# Patient Record
Sex: MALE | Race: WHITE | NOT HISPANIC OR LATINO | Employment: OTHER | URBAN - METROPOLITAN AREA
[De-identification: names, ages, dates, MRNs, and addresses within clinical notes are randomized per-mention and may not be internally consistent; named-entity substitution may affect disease eponyms.]

---

## 2018-11-23 ENCOUNTER — OFFICE VISIT (OUTPATIENT)
Dept: URGENT CARE | Facility: PHYSICIAN GROUP | Age: 53
End: 2018-11-23
Payer: MEDICARE

## 2018-11-23 VITALS
OXYGEN SATURATION: 95 % | WEIGHT: 207 LBS | TEMPERATURE: 97.5 F | HEIGHT: 70 IN | DIASTOLIC BLOOD PRESSURE: 72 MMHG | SYSTOLIC BLOOD PRESSURE: 124 MMHG | BODY MASS INDEX: 29.63 KG/M2 | HEART RATE: 72 BPM | RESPIRATION RATE: 12 BRPM

## 2018-11-23 DIAGNOSIS — M62.830 LUMBAR PARASPINAL MUSCLE SPASM: ICD-10-CM

## 2018-11-23 PROCEDURE — 99204 OFFICE O/P NEW MOD 45 MIN: CPT | Performed by: PHYSICIAN ASSISTANT

## 2018-11-23 RX ORDER — BACLOFEN 10 MG/1
10 TABLET ORAL 3 TIMES DAILY
COMMUNITY
End: 2018-11-24

## 2018-11-23 RX ORDER — TAMSULOSIN HYDROCHLORIDE 0.4 MG/1
0.4 CAPSULE ORAL
COMMUNITY

## 2018-11-23 RX ORDER — MELOXICAM 15 MG/1
15 TABLET ORAL DAILY
COMMUNITY

## 2018-11-23 RX ORDER — METOPROLOL SUCCINATE 50 MG/1
50 TABLET, EXTENDED RELEASE ORAL DAILY
COMMUNITY
End: 2018-11-24

## 2018-11-23 RX ORDER — FINASTERIDE 5 MG/1
5 TABLET, FILM COATED ORAL DAILY
COMMUNITY

## 2018-11-23 RX ORDER — INSULIN GLARGINE 100 [IU]/ML
44 INJECTION, SOLUTION SUBCUTANEOUS EVERY MORNING
COMMUNITY

## 2018-11-23 RX ORDER — KETOROLAC TROMETHAMINE 30 MG/ML
60 INJECTION, SOLUTION INTRAMUSCULAR; INTRAVENOUS ONCE
Status: COMPLETED | OUTPATIENT
Start: 2018-11-23 | End: 2018-11-23

## 2018-11-23 RX ORDER — TIZANIDINE HYDROCHLORIDE 2 MG/1
2 CAPSULE, GELATIN COATED ORAL 2 TIMES DAILY
COMMUNITY

## 2018-11-23 RX ORDER — MECLIZINE HYDROCHLORIDE 25 MG/1
25 TABLET ORAL 3 TIMES DAILY PRN
COMMUNITY

## 2018-11-23 RX ORDER — HYDROCODONE BITARTRATE AND ACETAMINOPHEN 10; 325 MG/1; MG/1
1 TABLET ORAL EVERY 6 HOURS PRN
COMMUNITY

## 2018-11-23 RX ORDER — SERTRALINE HYDROCHLORIDE 100 MG/1
200 TABLET, FILM COATED ORAL DAILY
COMMUNITY

## 2018-11-23 RX ORDER — PREGABALIN 100 MG/1
100 CAPSULE ORAL 3 TIMES DAILY
COMMUNITY

## 2018-11-23 RX ORDER — ALBUTEROL SULFATE 90 UG/1
2 AEROSOL, METERED RESPIRATORY (INHALATION) EVERY 6 HOURS PRN
COMMUNITY

## 2018-11-23 RX ORDER — CYCLOBENZAPRINE HCL 5 MG
5-10 TABLET ORAL 3 TIMES DAILY PRN
Qty: 30 TAB | Refills: 0 | Status: SHIPPED | OUTPATIENT
Start: 2018-11-23

## 2018-11-23 RX ORDER — SIMVASTATIN 40 MG
40 TABLET ORAL NIGHTLY
COMMUNITY

## 2018-11-23 RX ORDER — AMITRIPTYLINE HYDROCHLORIDE 100 MG/1
100 TABLET ORAL NIGHTLY
COMMUNITY
End: 2018-11-24 | Stop reason: CLARIF

## 2018-11-23 RX ORDER — OMEPRAZOLE 20 MG/1
20 CAPSULE, DELAYED RELEASE ORAL DAILY
COMMUNITY

## 2018-11-23 RX ADMIN — KETOROLAC TROMETHAMINE 60 MG: 30 INJECTION, SOLUTION INTRAMUSCULAR; INTRAVENOUS at 13:23

## 2018-11-23 ASSESSMENT — ENCOUNTER SYMPTOMS
BACK PAIN: 1
DIARRHEA: 0
NECK PAIN: 0
VOMITING: 0
FEVER: 0
ABDOMINAL PAIN: 0
CHILLS: 0
MYALGIAS: 0
NAUSEA: 0
SHORTNESS OF BREATH: 0

## 2018-11-23 NOTE — PROGRESS NOTES
"  Subjective:     Rudy Swanson is a 53 y.o. male who presents for Back Pain       Notes last 3d of back pain, much worse after driving, middle of road trip, Denies saddle anesthesia, incontinence of urine or bowel, retention of urine or bowel, also denies numbness/tingling or weakness to UE/LE - [facility] of back surgery in June 2017 - sees pain management next month - takes 10/325mg norco from pain - c/o sharp pain to right low back.  He denies fevers chills nausea vomiting.  He denies abdominal pain now.  He denies dysuria frequency or urgency.  He denies diarrhea or constipation.  Denies blood per stool or melena.  Denies body aches.  Past medical history of surgery to to S1 spine      Back Pain   This is a new problem. The current episode started 1 to 4 weeks ago. Pertinent negatives include no abdominal pain, dysuria or fever.   No past medical history on file.No past surgical history on file.  Social History     Social History   • Marital status: Single     Spouse name: N/A   • Number of children: N/A   • Years of education: N/A     Occupational History   • Not on file.     Social History Main Topics   • Smoking status: Not on file   • Smokeless tobacco: Not on file   • Alcohol use Not on file   • Drug use: Unknown   • Sexual activity: Not on file     Other Topics Concern   • Not on file     Social History Narrative   • No narrative on file    No family history on file. Review of Systems   Constitutional: Negative for chills and fever.   Respiratory: Negative for shortness of breath.    Gastrointestinal: Negative for abdominal pain, diarrhea, nausea and vomiting.   Genitourinary: Negative for dysuria, frequency and hematuria.   Musculoskeletal: Positive for back pain. Negative for joint pain, myalgias and neck pain.   Skin: Negative for rash.   No Known Allergies   Objective:   /72   Pulse 72   Temp 36.4 °C (97.5 °F)   Resp 12   Ht 1.778 m (5' 10\")   Wt 93.9 kg (207 lb)   SpO2 95%   BMI 29.70 " kg/m²   Physical Exam   Constitutional: He is oriented to person, place, and time. He appears well-developed and well-nourished. No distress.   HENT:   Head: Normocephalic and atraumatic.   Right Ear: External ear normal.   Left Ear: External ear normal.   Nose: Nose normal.   Eyes: Conjunctivae are normal. Right eye exhibits no discharge. Left eye exhibits no discharge. No scleral icterus.   Neck: Neck supple.   Pulmonary/Chest: Effort normal. No respiratory distress.   Abdominal: Soft. Normal appearance. There is no tenderness. There is no rigidity, no rebound, no guarding and no CVA tenderness.   Musculoskeletal: Normal range of motion.        Lumbar back: He exhibits tenderness ( primary paraspinous), pain and spasm. He exhibits no bony tenderness, no swelling, no edema, no deformity, no laceration and normal pulse. Decreased range of motion:  2/2 pain.   Neurological: He is alert and oriented to person, place, and time. He has normal strength and normal reflexes. He is not disoriented. No sensory deficit. He displays a negative Romberg sign. Coordination normal.   SLR normal bilat   Skin: Skin is warm and dry. He is not diaphoretic. No pallor.   Psychiatric: He has a normal mood and affect.   Nursing note and vitals reviewed.  toradol - tolerates well      Assessment/Plan:   Assessment    1. Lumbar paraspinal muscle spasm  - POCT Urinalysis  - ketorolac (TORADOL) injection 60 mg; 2 mL by Intramuscular route Once.  - cyclobenzaprine (FLEXERIL) 5 MG tablet; Take 1-2 Tabs by mouth 3 times a day as needed for Moderate Pain or Muscle Spasms.  Dispense: 30 Tab; Refill: 0    Other orders  - finasteride (PROSCAR) 5 MG Tab; Take 5 mg by mouth every day.  - tamsulosin (FLOMAX) 0.4 MG capsule; Take 0.4 mg by mouth ONE-HALF HOUR AFTER BREAKFAST.  - HYDROcodone/acetaminophen (NORCO)  MG Tab; Take 1-2 Tabs by mouth every 6 hours as needed.  - omeprazole (PRILOSEC) 20 MG delayed-release capsule; Take 20 mg by mouth  every day.  - Tiotropium Bromide Monohydrate (SPIRIVA RESPIMAT) 2.5 MCG/ACT Aero Soln; Inhale  by mouth.  - Mometasone Furo-Formoterol Fum (DULERA) 200-5 MCG/ACT Aerosol; Inhale  by mouth.  - albuterol (VENTOLIN HFA) 108 (90 Base) MCG/ACT Aero Soln inhalation aerosol; Inhale 2 Puffs by mouth every 6 hours as needed for Shortness of Breath.  - tizanidine (ZANAFLEX) 2 MG capsule; Take 2 mg by mouth 3 times a day.  - metformin (GLUCOPHAGE) 1000 MG tablet; Take 1,000 mg by mouth 2 times a day, with meals.  - Insulin Glargine (BASAGLAR KWIKPEN) 100 UNIT/ML Solution Pen-injector; Inject  as instructed every evening.  - metoprolol SR (TOPROL XL) 50 MG TABLET SR 24 HR; Take 50 mg by mouth every day.  - meclizine (ANTIVERT) 25 MG Tab; Take 25 mg by mouth 3 times a day as needed.  - sertraline (ZOLOFT) 100 MG Tab; Take 100 mg by mouth every day.  - simvastatin (ZOCOR) 40 MG Tab; Take 40 mg by mouth every evening.  - pregabalin (LYRICA) 100 MG Cap; Take 100 mg by mouth 2 times a day.  - meloxicam (MOBIC) 15 MG tablet; Take 15 mg by mouth every day.  - baclofen (LIORESAL) 10 MG Tab; Take 10 mg by mouth 3 times a day.  - amitriptyline (ELAVIL) 100 MG Tab; Take 100 mg by mouth every evening.  Recommend conservative care, rest, ice/heat, work on gentle ROM exercises, Toradol now, Flexeril, OTC NSAIDs, with lack of resolution follow-up with emergency department for lab testing or with onset of red flag symptoms, reviewed with patient today. Cannot check lipase here in UC - no indication to today - f/u w/ lack of resolution or worsening    Return to clinic with lack of resolution or progression of symptoms.  ER precautions with any worsening symptoms are reviewed with patient/caregiver and they do express understanding    Differential diagnosis, natural history, supportive care, and indications for immediate follow-up discussed.

## 2018-11-24 ENCOUNTER — HOSPITAL ENCOUNTER (EMERGENCY)
Facility: MEDICAL CENTER | Age: 53
End: 2018-11-24
Attending: EMERGENCY MEDICINE
Payer: MEDICARE

## 2018-11-24 VITALS
TEMPERATURE: 97.5 F | RESPIRATION RATE: 18 BRPM | SYSTOLIC BLOOD PRESSURE: 113 MMHG | DIASTOLIC BLOOD PRESSURE: 62 MMHG | HEART RATE: 70 BPM | BODY MASS INDEX: 29.98 KG/M2 | OXYGEN SATURATION: 96 % | HEIGHT: 70 IN | WEIGHT: 209.44 LBS

## 2018-11-24 DIAGNOSIS — Z79.891 CHRONIC PRESCRIPTION OPIATE USE: ICD-10-CM

## 2018-11-24 DIAGNOSIS — G89.29 CHRONIC RIGHT-SIDED LOW BACK PAIN WITHOUT SCIATICA: ICD-10-CM

## 2018-11-24 DIAGNOSIS — G89.29 OTHER CHRONIC PAIN: ICD-10-CM

## 2018-11-24 DIAGNOSIS — M54.50 CHRONIC RIGHT-SIDED LOW BACK PAIN WITHOUT SCIATICA: ICD-10-CM

## 2018-11-24 LAB — LIPASE SERPL-CCNC: 25 U/L (ref 7–58)

## 2018-11-24 PROCEDURE — 99284 EMERGENCY DEPT VISIT MOD MDM: CPT

## 2018-11-24 PROCEDURE — 83690 ASSAY OF LIPASE: CPT

## 2018-11-24 RX ORDER — AMITRIPTYLINE HYDROCHLORIDE 100 MG/1
150 TABLET ORAL
COMMUNITY

## 2018-11-24 RX ORDER — METOPROLOL TARTRATE 50 MG/1
50 TABLET, FILM COATED ORAL 2 TIMES DAILY
COMMUNITY

## 2018-11-24 RX ORDER — IBUPROFEN 800 MG/1
800 TABLET ORAL EVERY 8 HOURS PRN
Qty: 30 TAB | Refills: 0 | Status: SHIPPED | OUTPATIENT
Start: 2018-11-24

## 2018-11-24 ASSESSMENT — ENCOUNTER SYMPTOMS
HEADACHES: 0
NAUSEA: 0
FOCAL WEAKNESS: 0
CHILLS: 0
FALLS: 0
TINGLING: 0
FEVER: 0
SHORTNESS OF BREATH: 0
VOMITING: 0
BACK PAIN: 1
DIARRHEA: 0
ABDOMINAL PAIN: 0

## 2018-11-24 ASSESSMENT — PAIN SCALES - GENERAL: PAINLEVEL_OUTOF10: 6

## 2018-11-24 NOTE — ED NOTES
Pt reports he went to urgent care yesterday and was prescribed flexril. Pt went home and took the medication and applied heat pack without relief. Pt states he has a hx of pancreatitis and he has had an episode of pancreatitis that presents with this type of pain. Pt denies any incontinence or pain shooting down either of his legs.

## 2018-11-24 NOTE — ED NOTES
Pt given written and oral discharge instructions. Pt verbalized understanding of all instructions given. All questions answered. VSS. Pt given paper prescription and educated on use. Pt given f/u instructions and educated on s/s of when to return to the ER. Pt ambulating independently upon time of discharge in stable condition.

## 2018-11-24 NOTE — ED PROVIDER NOTES
ED Provider Note    ED Provider Note    Primary care provider: Pcp Pt States None  Means of arrival: POV  History obtained from: patient  History limited by: None    CHIEF COMPLAINT  Chief Complaint   Patient presents with   • Back Pain       HPI  Rudy Swanson is a 53 y.o. male who presents to the Emergency Department with a chief complaint of right low back pain.  Patient states he has chronic low back pain that is typically in this area but it is worse in the last few days.  He states he is here visiting his parents.  He lives in [place].  When he was packing up he somehow forgot to bring his Norco with him.  He states he has multiple tablets at home.  He typically takes 4 a day.  He noted on the drive up here, that he started to have pain.  He denies any bowel or bladder incontinence.  No nausea or vomiting.  No fever.  No chest pain or shortness of breath.  He was seen in the urgent care yesterday given a shot of what sounds like an anti-inflammatory.  They did discuss starting a short course of steroids but the patient's diabetic and was told that his sugars would increase so he decided against it.  He is taking ibuprofen at home.  He is requesting opioid pain medication.  States his last episode of pancreatitis was in about 2008 or 2009.  He does report that he had 2 episodes of pancreatitis when he was hospitalized in the past.  He states that at least 1 of those episodes, he presented with right low back pain.  He also states that in June 2017, he had back surgery.  He does admit to drinking alcohol a few days ago but not regularly.    REVIEW OF SYSTEMS  Review of Systems   Constitutional: Negative for chills and fever.   Respiratory: Negative for shortness of breath.    Cardiovascular: Negative for chest pain.   Gastrointestinal: Negative for abdominal pain, diarrhea, nausea and vomiting.   Genitourinary: Negative for dysuria and urgency.   Musculoskeletal: Positive for back pain. Negative for  "falls.   Neurological: Negative for tingling, focal weakness and headaches.   All other systems reviewed and are negative.      PAST MEDICAL HISTORY     Chronic low back pain  Pancreatitis    SURGICAL HISTORY  Back surgery.    SOCIAL HISTORY  Social History   Substance Use Topics   • Smoking status: Current Every Day Smoker     Packs/day: 0.50     Types: Cigarettes   • Smokeless tobacco: Never Used   • Alcohol use Yes      Comment: Rarely      History   Drug Use No       FAMILY HISTORY  History reviewed. No pertinent family history.    CURRENT MEDICATIONS  Home Medications    **Home medications have not yet been reviewed for this encounter**         ALLERGIES  No Known Allergies    PHYSICAL EXAM  VITAL SIGNS: /70   Pulse 88   Temp 36 °C (96.8 °F) (Temporal)   Resp 18   Ht 1.778 m (5' 10\")   Wt 95 kg (209 lb 7 oz)   SpO2 97%   BMI 30.05 kg/m²   Vitals reviewed.  Constitutional: Patient is oriented to person, place, and time. Appears well-developed and well-nourished. No distress.    Head: Normocephalic and atraumatic.   Eyes: Conjunctivae are normal.   Cardiovascular: Normal rate, regular rhythm and normal heart sounds.   Pulmonary/Chest: Effort normal and breath sounds normal. No respiratory distress, no wheezes, rhonchi, or rales.   Abdominal: Soft. Bowel sounds are normal. There is no tenderness. No rebound or guarding, or peritoneal signs. No CVA tenderness.  Musculoskeletal: No edema.  There is a right sided lumbar paraspinal muscle soreness.  No overlying skin changes.  No midline tenderness.    Neurological: No focal deficits. Normal gait.  No weakness of lower extremities.  No bowel or bladder incontinence.  Skin: Skin is warm and dry. No erythema. No pallor.   Psychiatric: Patient has a normal mood and affect.     LABS  Lipase - pending  All labs reviewed by me.    COURSE & MEDICAL DECISION MAKING  Pertinent Labs & Imaging studies reviewed. (See chart for details)    Obtained and reviewed past " medical records.  She has one other encounter it was in the urgent care yesterday.  At that time, he noted 3 days of worsening back pain after driving.  Patient sees a pain management specialist in [place].  He was given 60 mg of IM Toradol.  He was also prescribed Flexeril.    1:44 PM - Patient seen and examined at bedside.  This time, this appears to be an exacerbation of his chronic back pain likely secondary to a recent drive from [place], also related to not bringing his pain medication with him.  I advised the patient, due to his chronic pain and also being seen by pain management, he would not be prescribed opioids here in the ED.  All of of offered him alternatives including steroids, Tylenol or ibuprofen.  He states he can simply take 4 over-the-counter Advil at home which I have reassured him he can.  Vital signs are normal.  He has no red flag neurologic symptoms or other symptoms to suggest infection.  He does want to be evaluated for possible pancreatitis.  Will check lipase. If this is normal, anticipate discharge to home.     Patient care will be signed out to my partner, if lipase normal, patient can be discharged home.      FINAL IMPRESSION  1. Chronic right-sided low back pain without sciatica    2. Other chronic pain    3. Chronic prescription opiate use